# Patient Record
Sex: FEMALE | Race: BLACK OR AFRICAN AMERICAN | Employment: PART TIME | URBAN - NONMETROPOLITAN AREA
[De-identification: names, ages, dates, MRNs, and addresses within clinical notes are randomized per-mention and may not be internally consistent; named-entity substitution may affect disease eponyms.]

---

## 2020-06-11 ENCOUNTER — HOSPITAL ENCOUNTER (OUTPATIENT)
Dept: LAB | Age: 55
Discharge: HOME | End: 2020-06-11
Payer: COMMERCIAL

## 2020-06-11 ENCOUNTER — OFFICE VISIT (OUTPATIENT)
Dept: FAMILY MEDICINE CLINIC | Age: 55
End: 2020-06-11
Payer: COMMERCIAL

## 2020-06-11 VITALS
DIASTOLIC BLOOD PRESSURE: 70 MMHG | WEIGHT: 196 LBS | HEIGHT: 69 IN | HEART RATE: 84 BPM | SYSTOLIC BLOOD PRESSURE: 120 MMHG | TEMPERATURE: 97.4 F | RESPIRATION RATE: 18 BRPM | BODY MASS INDEX: 29.03 KG/M2

## 2020-06-11 LAB
ANION GAP SERPL CALCULATED.3IONS-SCNC: 11 MMOL/L (ref 9–17)
BUN BLDV-MCNC: 17 MG/DL (ref 6–20)
BUN/CREAT BLD: 24 (ref 9–20)
CALCIUM SERPL-MCNC: 9.4 MG/DL (ref 8.6–10.4)
CHLORIDE BLD-SCNC: 105 MMOL/L (ref 98–107)
CHOLESTEROL/HDL RATIO: 3.7
CHOLESTEROL: 227 MG/DL
CO2: 26 MMOL/L (ref 20–31)
CREAT SERPL-MCNC: 0.72 MG/DL (ref 0.5–0.9)
ESTIMATED AVERAGE GLUCOSE: 128 MG/DL
GFR AFRICAN AMERICAN: >60 ML/MIN
GFR NON-AFRICAN AMERICAN: >60 ML/MIN
GFR SERPL CREATININE-BSD FRML MDRD: ABNORMAL ML/MIN/{1.73_M2}
GFR SERPL CREATININE-BSD FRML MDRD: ABNORMAL ML/MIN/{1.73_M2}
GLUCOSE BLD-MCNC: 133 MG/DL (ref 70–99)
HBA1C MFR BLD: 6.1 % (ref 4.8–5.9)
HDLC SERPL-MCNC: 62 MG/DL
LDL CHOLESTEROL: 135 MG/DL (ref 0–130)
POTASSIUM SERPL-SCNC: 4.4 MMOL/L (ref 3.7–5.3)
SODIUM BLD-SCNC: 142 MMOL/L (ref 135–144)
TRIGL SERPL-MCNC: 150 MG/DL
VLDLC SERPL CALC-MCNC: ABNORMAL MG/DL (ref 1–30)

## 2020-06-11 PROCEDURE — 99203 OFFICE O/P NEW LOW 30 MIN: CPT | Performed by: FAMILY MEDICINE

## 2020-06-11 PROCEDURE — 80048 BASIC METABOLIC PNL TOTAL CA: CPT

## 2020-06-11 PROCEDURE — 3044F HG A1C LEVEL LT 7.0%: CPT | Performed by: FAMILY MEDICINE

## 2020-06-11 PROCEDURE — 83036 HEMOGLOBIN GLYCOSYLATED A1C: CPT

## 2020-06-11 PROCEDURE — 80061 LIPID PANEL: CPT

## 2020-06-11 PROCEDURE — G8427 DOCREV CUR MEDS BY ELIG CLIN: HCPCS | Performed by: FAMILY MEDICINE

## 2020-06-11 PROCEDURE — 99214 OFFICE O/P EST MOD 30 MIN: CPT

## 2020-06-11 PROCEDURE — 2022F DILAT RTA XM EVC RTNOPTHY: CPT | Performed by: FAMILY MEDICINE

## 2020-06-11 PROCEDURE — 1036F TOBACCO NON-USER: CPT | Performed by: FAMILY MEDICINE

## 2020-06-11 PROCEDURE — 36415 COLL VENOUS BLD VENIPUNCTURE: CPT

## 2020-06-11 PROCEDURE — G8419 CALC BMI OUT NRM PARAM NOF/U: HCPCS | Performed by: FAMILY MEDICINE

## 2020-06-11 PROCEDURE — 3017F COLORECTAL CA SCREEN DOC REV: CPT | Performed by: FAMILY MEDICINE

## 2020-06-11 RX ORDER — PREDNISONE 20 MG/1
20 TABLET ORAL DAILY
Qty: 5 TABLET | Refills: 0 | Status: SHIPPED | OUTPATIENT
Start: 2020-06-11 | End: 2020-06-16

## 2020-06-11 SDOH — HEALTH STABILITY: MENTAL HEALTH: HOW OFTEN DO YOU HAVE A DRINK CONTAINING ALCOHOL?: NEVER

## 2020-06-11 SDOH — ECONOMIC STABILITY: TRANSPORTATION INSECURITY
IN THE PAST 12 MONTHS, HAS LACK OF TRANSPORTATION KEPT YOU FROM MEETINGS, WORK, OR FROM GETTING THINGS NEEDED FOR DAILY LIVING?: PATIENT DECLINED

## 2020-06-11 SDOH — ECONOMIC STABILITY: FOOD INSECURITY: WITHIN THE PAST 12 MONTHS, THE FOOD YOU BOUGHT JUST DIDN'T LAST AND YOU DIDN'T HAVE MONEY TO GET MORE.: PATIENT DECLINED

## 2020-06-11 SDOH — ECONOMIC STABILITY: TRANSPORTATION INSECURITY
IN THE PAST 12 MONTHS, HAS THE LACK OF TRANSPORTATION KEPT YOU FROM MEDICAL APPOINTMENTS OR FROM GETTING MEDICATIONS?: PATIENT DECLINED

## 2020-06-11 SDOH — ECONOMIC STABILITY: FOOD INSECURITY: WITHIN THE PAST 12 MONTHS, YOU WORRIED THAT YOUR FOOD WOULD RUN OUT BEFORE YOU GOT MONEY TO BUY MORE.: PATIENT DECLINED

## 2020-06-11 SDOH — ECONOMIC STABILITY: INCOME INSECURITY: HOW HARD IS IT FOR YOU TO PAY FOR THE VERY BASICS LIKE FOOD, HOUSING, MEDICAL CARE, AND HEATING?: PATIENT DECLINED

## 2020-06-11 ASSESSMENT — PATIENT HEALTH QUESTIONNAIRE - PHQ9
SUM OF ALL RESPONSES TO PHQ QUESTIONS 1-9: 1
1. LITTLE INTEREST OR PLEASURE IN DOING THINGS: 0
2. FEELING DOWN, DEPRESSED OR HOPELESS: 1
SUM OF ALL RESPONSES TO PHQ9 QUESTIONS 1 & 2: 1
SUM OF ALL RESPONSES TO PHQ QUESTIONS 1-9: 1

## 2020-06-11 ASSESSMENT — ENCOUNTER SYMPTOMS
COUGH: 0
CHEST TIGHTNESS: 0
SHORTNESS OF BREATH: 0
WHEEZING: 0

## 2020-06-11 NOTE — PROGRESS NOTES
provided no relief. She had a trigger finger that was released with an injection but she did not like it. Past Medical History:   Diagnosis Date    Anxiety     Depression     Hypercholesteremia     Insomnia     Type 2 diabetes mellitus ([address]      History reviewed. No pertinent surgical history. Family History   Problem Relation Age of Onset    Diabetes type 2  Mother     Hypertension Mother     Alcohol Abuse Mother     Colon Cancer Father     Alcohol Abuse Father     Depression Sister     Depression Brother     Depression Brother     Depression Brother     Depression Sister     Depression Sister        Social History     Tobacco Use    Smoking status: Never Smoker    Smokeless tobacco: Never Used   Substance Use Topics    Alcohol use: Not Currently     Frequency: Never      Prior to Visit Medications    Not on File     No Known Allergies    Subjective:      Review of Systems   Constitutional: Negative for activity change, appetite change, chills, fatigue and fever. Eyes: Negative for visual disturbance. Respiratory: Negative for cough, chest tightness, shortness of breath and wheezing. Cardiovascular: Negative for chest pain, palpitations and leg swelling. Gastrointestinal:        She used to have GERD but it is better now   Genitourinary: Negative for difficulty urinating. Musculoskeletal: Positive for arthralgias (left elbow). Neurological: Negative for dizziness, tingling (in her right hand), syncope, weakness, light-headedness, numbness and headaches. Psychiatric/Behavioral: Positive for sleep disturbance (better with the marijuana). Objective:     Physical Exam  Vitals signs and nursing note reviewed. Constitutional:       General: She is not in acute distress. Appearance: She is well-developed. Eyes:      Conjunctiva/sclera: Conjunctivae normal.   Neck:      Musculoskeletal: Normal range of motion and neck supple. Thyroid: No thyromegaly.

## 2020-07-02 ENCOUNTER — HOSPITAL ENCOUNTER (OUTPATIENT)
Dept: LAB | Age: 55
Discharge: HOME | End: 2020-07-02
Payer: COMMERCIAL

## 2020-07-02 ENCOUNTER — OFFICE VISIT (OUTPATIENT)
Dept: FAMILY MEDICINE CLINIC | Age: 55
End: 2020-07-02
Payer: COMMERCIAL

## 2020-07-02 VITALS
RESPIRATION RATE: 18 BRPM | WEIGHT: 194 LBS | OXYGEN SATURATION: 94 % | DIASTOLIC BLOOD PRESSURE: 70 MMHG | HEART RATE: 68 BPM | BODY MASS INDEX: 28.73 KG/M2 | HEIGHT: 69 IN | SYSTOLIC BLOOD PRESSURE: 120 MMHG

## 2020-07-02 PROBLEM — K21.9 GASTROESOPHAGEAL REFLUX DISEASE WITHOUT ESOPHAGITIS: Status: ACTIVE | Noted: 2020-07-02

## 2020-07-02 PROBLEM — Z86.19 HISTORY OF HELICOBACTER PYLORI INFECTION: Status: ACTIVE | Noted: 2020-07-02

## 2020-07-02 LAB
HEPATITIS C ANTIBODY: NONREACTIVE
HIV AG/AB: NONREACTIVE

## 2020-07-02 PROCEDURE — 87491 CHLMYD TRACH DNA AMP PROBE: CPT

## 2020-07-02 PROCEDURE — 99213 OFFICE O/P EST LOW 20 MIN: CPT

## 2020-07-02 PROCEDURE — 87389 HIV-1 AG W/HIV-1&-2 AB AG IA: CPT

## 2020-07-02 PROCEDURE — G0145 SCR C/V CYTO,THINLAYER,RESCR: HCPCS

## 2020-07-02 PROCEDURE — 99396 PREV VISIT EST AGE 40-64: CPT | Performed by: FAMILY MEDICINE

## 2020-07-02 PROCEDURE — 87591 N.GONORRHOEAE DNA AMP PROB: CPT

## 2020-07-02 PROCEDURE — 86803 HEPATITIS C AB TEST: CPT

## 2020-07-02 PROCEDURE — 36415 COLL VENOUS BLD VENIPUNCTURE: CPT

## 2020-07-02 PROCEDURE — 87624 HPV HI-RISK TYP POOLED RSLT: CPT

## 2020-07-02 RX ORDER — DOXEPIN HYDROCHLORIDE 10 MG/1
10 CAPSULE ORAL NIGHTLY PRN
Qty: 30 CAPSULE | Refills: 1 | Status: SHIPPED | OUTPATIENT
Start: 2020-07-02

## 2020-07-02 RX ORDER — CHLORAL HYDRATE 500 MG
3000 CAPSULE ORAL 3 TIMES DAILY
Qty: 90 CAPSULE | Refills: 3 | Status: SHIPPED | OUTPATIENT
Start: 2020-07-02

## 2020-07-02 ASSESSMENT — ENCOUNTER SYMPTOMS
CONSTIPATION: 0
ABDOMINAL PAIN: 0
WHEEZING: 0
CHEST TIGHTNESS: 0
DIARRHEA: 0
SHORTNESS OF BREATH: 0
COUGH: 0

## 2020-07-02 NOTE — PROGRESS NOTES
[facility] [address]  [address] [address]  Dept: [phone]  Dept Fax: [phone]  Loc: [phone]    Diana Florence  is a 49 y.o. female who presents today for her medical conditions/complaints as noted below. Diana Florence is c/o of   Chief Complaint   Patient presents with    Anxiety     very emotional, brother passed away    Insomnia     not sleeping well    Joint Swelling     still having pain       HPI:     HPI Here today for her well visit. Anxiety: worsening; she has not been doing well. Her brother recently . She has been having trouble sleeping. She feels like she can't function with her job. She is very spiritual and she typically only does things if there is a reason for them and typically the reason she does things is to help her. She is getting very anxious in the middle of the night and it just isn't helping. She is having trouble falling asleep and she is not staying asleep well. She tries to remind herself that she is safe. She has roommates. She has never taken anything to help her sleep before. She occasionally smokes marijuana before bed, but she hasn't had the money for that. Elbow pain: improving; she got some relief from the steroids but her pain is still about a 7/10 at the end of her shift. She is rarely using nsaids. She is doing her home exercises but they haven't helped much yet. Her muscle cramps are better since being off of the statins. No issues with vaginal discharge, no odor, no itching or burning. She is just concerned because she broke up with her boyfriend. Past Medical History:   Diagnosis Date    Anxiety     Depression     Hypercholesteremia     Insomnia     Type 2 diabetes mellitus ([facility] [address]      History reviewed. No pertinent surgical history.     Family History   Problem Relation Age of Onset    Diabetes type 2  Mother     Hypertension Mother    Montrell Macdonald Alcohol Abuse Mother     Colon Cancer Father     Alcohol Abuse Father     Depression Sister     Depression Brother     Depression Brother     Depression Brother     Depression Sister     Depression Sister        Social History     Tobacco Use    Smoking status: Never Smoker    Smokeless tobacco: Never Used   Substance Use Topics    Alcohol use: Not Currently     Frequency: Never      Prior to Visit Medications    Medication Sig Taking? Authorizing Provider   Multiple Vitamins-Minerals (CENTRUM WOMEN PO) Take 1 tablet by mouth daily Yes Historical Provider, MD     No Known Allergies    Subjective:      Review of Systems   Constitutional: Negative for activity change, appetite change, chills, fatigue and fever. Eyes: Negative for visual disturbance. Respiratory: Negative for cough, chest tightness, shortness of breath and wheezing. Cardiovascular: Negative for chest pain, palpitations and leg swelling. Gastrointestinal: Negative for abdominal pain (some heart burn occasionally), constipation and diarrhea. Genitourinary: Negative for decreased urine volume, difficulty urinating, dysuria and vaginal discharge. Musculoskeletal: Positive for arthralgias (left elbow). Neurological: Negative for dizziness, syncope, weakness and light-headedness. Objective:     Physical Exam  Vitals signs and nursing note reviewed. Constitutional:       General: She is not in acute distress. Appearance: She is well-developed. Eyes:      Conjunctiva/sclera: Conjunctivae normal.   Neck:      Musculoskeletal: Normal range of motion and neck supple. Thyroid: No thyromegaly. Cardiovascular:      Rate and Rhythm: Normal rate and regular rhythm. Heart sounds: Normal heart sounds. No murmur. Pulmonary:      Effort: Pulmonary effort is normal. No respiratory distress. Breath sounds: Normal breath sounds. No wheezing.    Chest:      Breasts:         Right: Normal.         Left: Normal. Genitourinary:     Labia:         Right: No rash, tenderness, lesion or injury. Left: No rash, tenderness, lesion or injury. Urethra: No prolapse. Vagina: Normal.      Cervix: Normal.      Uterus: Normal.       Adnexa: Right adnexa normal and left adnexa normal.   Musculoskeletal:      Left elbow: She exhibits swelling. She exhibits normal range of motion, no effusion and no deformity. Tenderness found. Medial epicondyle tenderness noted. Lymphadenopathy:      Cervical: No cervical adenopathy. Skin:     General: Skin is warm and dry. Findings: No erythema or rash. Neurological:      Mental Status: She is alert and oriented to person, place, and time. /70 (Site: Right Upper Arm, Position: Sitting, Cuff Size: Large Adult)   Pulse 68   Resp 18   Ht 5' 9\" (1.753 m)   Wt 194 lb (88 kg)   SpO2 94%   BMI 28.65 kg/m²     Assessment:       Diagnosis Orders   1. Well woman exam with routine gynecological exam  PAP SMEAR   2. Medial epicondylitis, left elbow  diclofenac sodium (VOLTAREN) 1 % GEL    [facility] [place]   3. Psychophysiological insomnia     4. Gastroesophageal reflux disease without esophagitis     5. Screening for HIV (human immunodeficiency virus)  HIV Screen   6. Need for hepatitis C screening test  Hepatitis C Antibody   7. Screening examination for STD (sexually transmitted disease)  Chlamydia Trachomatis & Neisseria gonorrhoeae (GC) by amplified detection   8. History of Helicobacter pylori infection               Plan:       Well woman: she is doing well overall and she is trying to stay active and eat healthy. Her pap was done today as well as STD testing. She has a mammogram scheduled. Medial epicondylitis: improving; she is still having pain so I sent in Voltaren gel to try and I also referred her to PT. Insomnia: new; has started since her brother diet. I will try her on doxepin.      GERD: stable; she is doing okay as long as Acids (FISH OIL) 1000 MG CAPS     Sig: Take 3 capsules by mouth 3 times daily     Dispense:  90 capsule     Refill:  3       Patientgiven educational materials - see patient instructions. Discussed use, benefit,and side effects of prescribed medications. All patient questions answered. Ptvoiced understanding. Reviewed health maintenance. Instructed to continue currentmedications, diet and exercise. Patient agreed with treatment plan. Follow up asdirected. Royal Soriano LPN, am scribing for, and in the presence of Dr. Tiffany Barboza. Electronically signed by Rosemarie Sanchez LPN on 9/5/01 at 4:89 PM EDT. I agree to the above documentation placed by my scribe. I have personally evaluated this patient. Additional findings are as noted. I reviewed the scribe's note and agree with the documented findings and plan of care. Any areas of disagreement are corrected. I agree with the chief complaint, past medical history, past surgical history, allergies, medications, social and family history as documented unless otherwise noted below.      Electronically signed by Ivelisse Ortega MD on 7/2/2020 at 5:52 PM

## 2020-07-06 LAB
C TRACH DNA GENITAL QL NAA+PROBE: NEGATIVE
HPV SAMPLE: NORMAL
HPV, GENOTYPE 16: NOT DETECTED
HPV, GENOTYPE 18: NOT DETECTED
HPV, HIGH RISK OTHER: NOT DETECTED
HPV, INTERPRETATION: NORMAL
N. GONORRHOEAE DNA: NEGATIVE
SPECIMEN DESCRIPTION: NORMAL
SPECIMEN DESCRIPTION: NORMAL

## 2020-07-09 LAB — CYTOLOGY REPORT: NORMAL

## 2020-07-15 ENCOUNTER — OFFICE VISIT (OUTPATIENT)
Dept: OPTOMETRY | Age: 55
End: 2020-07-15
Payer: COMMERCIAL

## 2020-07-15 PROCEDURE — 92250 FUNDUS PHOTOGRAPHY W/I&R: CPT | Performed by: OPTOMETRIST

## 2020-07-15 PROCEDURE — 99204 OFFICE O/P NEW MOD 45 MIN: CPT | Performed by: OPTOMETRIST

## 2020-07-15 PROCEDURE — 2024F 7 FLD RTA PHOTO EVC RTNOPTHY: CPT | Performed by: OPTOMETRIST

## 2020-07-15 PROCEDURE — G8427 DOCREV CUR MEDS BY ELIG CLIN: HCPCS | Performed by: OPTOMETRIST

## 2020-07-15 PROCEDURE — 1036F TOBACCO NON-USER: CPT | Performed by: OPTOMETRIST

## 2020-07-15 PROCEDURE — 3017F COLORECTAL CA SCREEN DOC REV: CPT | Performed by: OPTOMETRIST

## 2020-07-15 PROCEDURE — G8419 CALC BMI OUT NRM PARAM NOF/U: HCPCS | Performed by: OPTOMETRIST

## 2020-07-15 PROCEDURE — 3044F HG A1C LEVEL LT 7.0%: CPT | Performed by: OPTOMETRIST

## 2020-07-15 RX ORDER — TROPICAMIDE 10 MG/ML
1 SOLUTION/ DROPS OPHTHALMIC ONCE
Status: COMPLETED | OUTPATIENT
Start: 2020-07-15 | End: 2020-07-15

## 2020-07-15 RX ADMIN — TROPICAMIDE 1 DROP: 10 SOLUTION/ DROPS OPHTHALMIC at 15:27

## 2020-07-15 ASSESSMENT — REFRACTION_MANIFEST
OD_CYLINDER: -0.50
OD_AXIS: 080
OS_ADD: +2.00
OD_SPHERE: +0.25
OS_CYLINDER: -0.75
OS_AXIS: 125
OD_ADD: +2.00
OS_SPHERE: +1.25

## 2020-07-15 ASSESSMENT — REFRACTION_WEARINGRX
SPECS_TYPE: OTC READERS
OS_SPHERE: +1.75
OD_SPHERE: +1.75

## 2020-07-15 ASSESSMENT — ENCOUNTER SYMPTOMS
EYES NEGATIVE: 0
GASTROINTESTINAL NEGATIVE: 0
ALLERGIC/IMMUNOLOGIC NEGATIVE: 0
RESPIRATORY NEGATIVE: 0

## 2020-07-15 ASSESSMENT — VISUAL ACUITY
METHOD: SNELLEN - LINEAR
OS_SC: 20/25
OD_SC: 20/30 OU
OD_SC: 20/20

## 2020-07-15 ASSESSMENT — TONOMETRY
OD_IOP_MMHG: 20
IOP_METHOD: NON-CONTACT AIR PUFF
OS_IOP_MMHG: 23

## 2020-07-15 ASSESSMENT — SLIT LAMP EXAM - LIDS
COMMENTS: NORMAL
COMMENTS: NORMAL

## 2020-07-15 NOTE — PROGRESS NOTES
Inge Loco presents today for   Chief Complaint   Patient presents with    Blurred Vision    Ophth Diabetic Exam   .    HPI     Blurred Vision     In both eyes. Vision is blurred. Context:  distance vision and reading. Comments     Last Vision Exam: 5 + yrs ago  Last Ophthalmology Exam: n/a  Last Filled Glasses Rx: 5 + yrs  Insurance: Superior  Update: Dm Exam; Glasses  Distance and reading are getting more blurry  Wearing OTC +1.75  Diabetic:  Sugars: doesn't have to check. HmgA1C: 6.1  6/11/2020  Near is blurry; wearing over the counter  Distance is blurry   Dilation today                    Current Outpatient Medications   Medication Sig Dispense Refill    diclofenac sodium (VOLTAREN) 1 % GEL Apply 2 g topically 4 times daily as needed for Pain 1 Tube 3    doxepin (SINEQUAN) 10 MG capsule Take 1 capsule by mouth nightly as needed (sleep) 30 capsule 1    Omega-3 Fatty Acids (FISH OIL) 1000 MG CAPS Take 3 capsules by mouth 3 times daily 90 capsule 3    Multiple Vitamins-Minerals (CENTRUM WOMEN PO) Take 1 tablet by mouth daily       No current facility-administered medications for this visit.       ROS     Negative for: Constitutional, Gastrointestinal, Neurological, Skin, Genitourinary, Musculoskeletal, HENT, Endocrine, Cardiovascular, Eyes, Respiratory, Psychiatric, Allergic/Imm, Heme/Lymph          Family History   Problem Relation Age of Onset    Diabetes type 2  Mother     Hypertension Mother     Alcohol Abuse Mother     Colon Cancer Father     Alcohol Abuse Father     Depression Sister     Depression Brother     Depression Brother     Depression Brother     Depression Sister     Depression Sister     Cataracts Neg Hx     Diabetes Neg Hx     Glaucoma Neg Hx      Social History     Socioeconomic History    Marital status:      Spouse name: None    Number of children: None    Years of education: None    Highest education level: None   Occupational History    None Social Needs    Financial resource strain: Patient refused    Food insecurity     Worry: Patient refused     Inability: Patient refused    Transportation needs     Medical: Patient refused     Non-medical: Patient refused   Tobacco Use    Smoking status: Never Smoker    Smokeless tobacco: Never Used   Substance and Sexual Activity    Alcohol use: Not Currently     Frequency: Never    Drug use: Yes     Types: Marijuana     Comment: uses before bed and when wakes up in the middle of the night    Sexual activity: Not Currently     Partners: Male   Lifestyle    Physical activity     Days per week: None     Minutes per session: None    Stress: None   Relationships    Social connections     Talks on phone: None     Gets together: None     Attends Yazidism service: None     Active member of club or organization: None     Attends meetings of clubs or organizations: None     Relationship status: None    Intimate partner violence     Fear of current or ex partner: None     Emotionally abused: None     Physically abused: None     Forced sexual activity: None   Other Topics Concern    None   Social History Narrative    None       Past Medical History:   Diagnosis Date    Anxiety     Depression     Hypercholesteremia     Insomnia     Type 2 diabetes mellitus ([facility] [address]          Main Ophthalmology Exam     External Exam       Right Left    External Normal Normal          Slit Lamp Exam       Right Left    Lids/Lashes Normal Normal    Conjunctiva/Sclera White and quiet White and quiet    Cornea Clear Clear    Anterior Chamber Deep and quiet Deep and quiet    Iris Round and reactive Round and reactive    Lens Trace Nuclear sclerosis pigment on anterior capusle;, Trace Nuclear sclerosis    Vitreous Normal Normal          Fundus Exam       Right Left    Disc Normal Normal    C/D Ratio 0.2 0.2    Macula Normal slight mottling     Vessels Normal Normal    Periphery Normal Normal    78 diopter Tonometry     Tonometry (Non-contact air puff, 3:20 PM)       Right Left    Pressure 20 23   IOP.1             23.3  CH:  11.4          9.8  WS: 4.3          6.3                   Visual Acuity (Snellen - Linear)       Right Left    Dist sc 20/20 20/25    Near sc 20/30 OU          Not recorded        Pupils     Pupils       Pupils    Right PERRL    Left PERRL               Not recorded         Not recorded          Ophthalmology Exam     Wearing Rx       Sphere    Right +1.75    Left +1.75    Type:  OTC Readers                Manifest Refraction     Manifest Refraction       Sphere Cylinder Axis Dist VA Add    Right +0.25 -0.50 080 20/25 +2.00    Left +1.25 -0.75 125 20/25 +2.00          Manifest Refraction #2 (Auto)       Sphere Cylinder Axis Dist VA Add    Right +0.50 -0.75 075      Left +1.50 -1.00 119                 Final Rx       Sphere Cylinder Axis Add    Right +0.25 -0.50 080 +2.00    Left +1.25 -0.75 125 +2.00    Type:  Bifocal    Expiration Date:  2022          Neuro/Psych     Neuro/Psych     Oriented x3:  Yes    Mood/Affect:  Normal                No diabetic retinopathy    Diagnosis Orders   1. Non-insulin dependent type 2 diabetes mellitus (HCC)  [facility] DIABETES EYE EXAM    Color Fundus Photography-OU-Both Eyes   2. Blurred vision, bilateral     3.  Astigmatism of both eyes with presbyopia         Glycemic control as per PCP   Patient Instructions   New glasses recommended;    Keep yearly appointments because of diabetes        Return in about 1 year (around 7/15/2021) for complete eye exam.

## 2020-07-17 ENCOUNTER — TELEPHONE (OUTPATIENT)
Dept: FAMILY MEDICINE CLINIC | Age: 55
End: 2020-07-17

## 2020-07-17 ENCOUNTER — HOSPITAL ENCOUNTER (OUTPATIENT)
Dept: PHYSICAL THERAPY | Age: 55
Setting detail: THERAPIES SERIES
Discharge: HOME | End: 2020-07-17

## 2020-07-17 ENCOUNTER — HOSPITAL ENCOUNTER (OUTPATIENT)
Dept: OCCUPATIONAL THERAPY | Age: 55
Setting detail: THERAPIES SERIES
Discharge: HOME | End: 2020-07-17
Payer: COMMERCIAL

## 2020-07-17 PROCEDURE — 97165 OT EVAL LOW COMPLEX 30 MIN: CPT

## 2020-07-17 PROCEDURE — 97140 MANUAL THERAPY 1/> REGIONS: CPT

## 2020-07-17 ASSESSMENT — 9 HOLE PEG TEST
TEST_RESULT: FUNCTIONAL
TESTTIME_SECONDS: 22
TESTTIME_SECONDS: 18
TEST_RESULT: IMPAIRED

## 2020-07-17 ASSESSMENT — PAIN DESCRIPTION - DESCRIPTORS: DESCRIPTORS: ACHING

## 2020-07-17 ASSESSMENT — PAIN DESCRIPTION - LOCATION: LOCATION: ELBOW

## 2020-07-17 ASSESSMENT — PAIN DESCRIPTION - ORIENTATION: ORIENTATION: LEFT

## 2020-07-17 ASSESSMENT — PAIN SCALES - GENERAL: PAINLEVEL_OUTOF10: 8

## 2020-07-17 NOTE — FLOWSHEET NOTE
[facility]    [x] [place]  Phone: [phone]  Fax: [phone]      [] [place]  Phone: [phone]  Fax: [phone]    Occupational Therapy Daily Treatment Note  Date:  2020    Patient Name:  Fatemeh Lobo    :  1965  MRN: [id number]  Restrictions/Precautions:      Medical/Treatment Diagnosis Information:   Diagnosis: Medial epicondylitis, left elbow      Insurance/Certification information:   Physician Information: Referring Practitioner: Estela Self  Plan of care signed (Y/N):   n    Visit# / total visits:      Pain level: /10     Progress Note: [x]  Yes  []  No  Next due by: Visit #10      Date of evaluation/re-evaluation:    Time In: 325  Time Out:  410    Subjective:   Pt arrived to appointment late, pt stated she was on her lunch break from work. Pt is a 48 yo female with medial epicondylitis of left elbow. Objective/Assessment:   Initial evaluation completed this date, see progress note for details.     Pt instructed to complete massage to medial aspect of left elbow and to complete stretch with elbow extended and forearm supinated, actively extending the wrist.    Exercises:   Exercise/Equipment Resistance/Repetitions Other comments                                                                          Therapeutic Exercise  [] Provided verbal/tactile cueing for activities related to strengthening, flexibility, endurance, ROM. (79380)  Neuro  Re-Ed  [] Provided verbal/tactile cueing for activities related to improving balance, coordination, kinesthetic sense, posture, motor skill, proprioception. (40238)     Therapeutic Activities/ADL:   [] Provided use of dynamic activities to improve functional performance (30650)  [] Provided self-care/home management training for activities of daily living and compensatory training (15159)     Manual Treatments:   [] Provided manual therapy to mobilize soft tissue/joints for the purpose of

## 2020-07-17 NOTE — TELEPHONE ENCOUNTER
Maria Antonia from rehab called and stated that patients order for PT needs to be changed to OT.  Thank you

## 2020-07-17 NOTE — PLAN OF CARE
Occupational Therapy    [x] [place]  Phone: [phone]  Fax: [phone]      [] [place]  Phone: [phone]  Fax: [phone]       To: Referring Practitioner: Naeem Alexis      Patient: Inge Loco  : 1965  MRN: [id number]  Evaluation Date: 2020      Diagnosis Information:  Diagnosis: Medial epicondylitis, left elbow         Occupational Therapy Certification/Re-Certification Form  Dear Dr. Guido Silver,  The following patient has been evaluated for occupational therapy services and for therapy to continue, insurance requires physician review of the treatment plan. Please review the attached evaluation and/or summary of the patient's plan of care, and verify that you agree therapy should continue by signing the attached document and sending it back to our office.     Plan of Care/Treatment to date: 2020-2020  [x] Therapeutic Exercise   [x] Modalities:  [x] Therapeutic Activity    [x] Ultrasound  [x] Electrical Stimulation   [x] Activities of Daily Living    [] Paraffin   [x] Kinesiotaping  [] Neuromuscular Re-education   [] Iontophoresis [x] Coldpack/hotpack   [x] Instruction in HEP     [] Integrative Dry Needling  [x] Manual Therapy     Other:  [] Aquatic Therapy      []       Frequency/Duration:  # Days per week: [] 1 day # Weeks: [] 1 week [] 5 weeks      [x] 2-3 days   [] 2 weeks [] 6 weeks     [] 3 days   [] 3 weeks [] 7 weeks     [] 4 days   [x] 4 weeks [] 8 weeks  Goals:  Short term goals  Time Frame for Short term goals: 2 weeks  Short term goal 1: Fit/issue orthotics/splinting as needed  Short term goal 2: Pt will decrease edema in L elbow by 0.5 cm  Short term goal 3: Pt will report decreased pain of < 4/10 with use and movement to increase functional use of left UE     Long term goals  Time Frame for Long term goals : 4 weeks  Long term goal 1: Pt will be independent and compliant with HEP  Long term goal 2: Pt will report decreased pain of < 1-2/10 with use and movement to increase functional use of left UE  Long term goal 3: Pt will improve  strength in LUE to > 55# to improve ability to lift and carry for work tasks  Long term goal 4: Pt will increase score on UEFI to < 20% impairment with daily tasks to indicate increased ease with functional tasks    Rehab Potential: [] excellent [x] good [] fair  [] poor     Electronically signed by:  Velma Contreras OT      If you have any questions or concerns, please don't hesitate to call.   Thank you for your referral.      Physician Signature:________________________________Date:__________________  By signing above, therapists plan is approved by physician

## 2020-07-17 NOTE — PROGRESS NOTES
0°  L Forearm Pron 0-90: 90°  Left Hand PROM (degrees)  Left Hand PROM: WFL  Left Hand AROM (degrees)  Left Hand AROM: WFL  RUE AROM (degrees)  RUE AROM : WFL  R Elbow Flexion 0-145: 145°  R Elbow Extension 145-0: 0°  R Forearm Pron 0-90: 90°  Right Hand AROM (degrees)  Right Hand AROM: WFL  LUE Strength  Gross LUE Strength: WFL  L Shoulder Flex: 4+/5  L Shoulder ABduction: 4+/5  L Elbow Flex: 4/5  L Elbow Ext: 4/5  L Wrist Flex: 4+/5  L Wrist Ext: 4+/5  L Wrist Radial Deviation: 4+/5  L Wrist Ulnar Deviation: 4+/5  L Hand General: 4+/5  RUE Strength  Gross RUE Strength: WFL  R Shoulder Flex: 4+/5  R Shoulder ABduction: 4+/5  R Elbow Flex: 4+/5  R Elbow Ext: 4+/5  R Wrist Flex: 5/5  R Wrist Ext: 5/5  R Wrist Radial Deviation: 5/5  R Wrist Ulnar Deviation: 5/5  R Hand General: 5/5  Hand Dominance  Hand Dominance: Right  Left Hand Strength -  (lbs)  Handle Setting 2: 39((Avg= 57.3))  Left Hand Strength - Pinch (lbs)  Lateral: 8  Tip: 9  Palmar 3 point: 10  LUE Edema - Circumference (cm)  LUE Edema Present?: Yes  Proximal to Elbow Crease (5\"): 28(measured at 2 cm above olecranon process)  Elbow Crease: 26.9  Left 9-Hole Peg Test  Left 9-Hole Peg Test: Impaired  Right Hand Strength -  (lbs)  Handle Setting 2: 60  Right Hand Strength - Pinch (lbs)  Lateral: 20  Tip: 12  Palmar 3 point: 16  RUE Edema - Circumference (cm)  RUE Edema Present?: No  Proximal to Elbow Crease (5\"): 27. 5(measured at 2 cm above olecranon process)  Elbow Crease: 26.4  Right 9-Hole Peg Test  Right 9-Hole Peg Test: Functional  Fine Motor Skills  Left 9-Hole Peg Test: Impaired  Left 9 Hole Peg Test Time (secs): 22((Avg= 18.92))  Right 9-Hole Peg Test: Functional  Right 9 Hole Peg Test Time (secs): 18((Avg= 17.38))  Hand Assessment Comment  Hand Assessment Comment: Upper Extremity Functional Index (UEFI): 46/80, indicating 57.5% impairment with daily tasks    Plan   Plan  Times per week: 2-3  Plan weeks: 4    Goals  Short term goals  Time Frame for Short term goals: 2 weeks  Short term goal 1: Fit/issue orthotics/splinting as needed  Short term goal 2: Pt will decrease edema in L elbow by 0.5 cm  Short term goal 3: Pt will report decreased pain of < 4/10 with use and movement to increase functional use of left UE    Long term goals  Time Frame for Long term goals : 4 weeks  Long term goal 1: Pt will be independent and compliant with HEP  Long term goal 2: Pt will report decreased pain of < 1-2/10 with use and movement to increase functional use of left UE  Long term goal 3: Pt will improve  strength in LUE to > 55# to improve ability to lift and carry for work tasks  Long term goal 4: Pt will increase score on UEFI to < 20% impairment with daily tasks to indicate increased ease with functional tasks       Therapy Time   Individual Concurrent Group Co-treatment   Time In 1525         Time Out 1610         Minutes 45         Timed Code Treatment Minutes: [address], OT

## 2020-08-04 ENCOUNTER — HOSPITAL ENCOUNTER (OUTPATIENT)
Dept: OCCUPATIONAL THERAPY | Age: 55
Setting detail: THERAPIES SERIES
Discharge: HOME | End: 2020-08-04
Payer: COMMERCIAL

## 2020-08-04 NOTE — PROGRESS NOTES
Outpatient Occupational Therapy    [x] [place]  Phone: [phone]  Fax: [phone]      [] [place]  Phone: [phone]  Fax: [phone]    Occupational Therapy  Cancellation/No-show Note  Patient Name:  Fatemeh Lobo   :  1965   Date:  2020  Cancelled visits to date: 1  No-shows to date: 0    For today's appointment patient:  [x]    Cancelled  []    Rescheduled appointment  []    No-show     Reason given by patient:  []    Patient ill  []    Conflicting appointment  []    No transportation    []    Conflict with work  [x]    No reason given  []    Other:     Comments:      Electronically signed by:  Arvell Lanes, OT

## 2020-08-07 ENCOUNTER — HOSPITAL ENCOUNTER (OUTPATIENT)
Dept: OCCUPATIONAL THERAPY | Age: 55
Setting detail: THERAPIES SERIES
Discharge: HOME | End: 2020-08-07
Payer: COMMERCIAL

## 2020-08-07 NOTE — PROGRESS NOTES
Outpatient Occupational Therapy    [x] [place]  Phone: [phone]  Fax: [phone]      [] [place]  Phone: [phone]  Fax: [phone]    Occupational Therapy  Cancellation/No-show Note  Patient Name:  Yoandy Fajardo   :  1965   Date:  2020  Cancelled visits to date: 2  No-shows to date: 0    For today's appointment patient:  [x]    Cancelled  []    Rescheduled appointment  []    No-show     Reason given by patient:  [x]    Patient ill  []    Conflicting appointment  []    No transportation    []    Conflict with work  []    No reason given  []    Other:     Comments:      Electronically signed by:  Dung Cramer OT

## 2020-08-10 ENCOUNTER — HOSPITAL ENCOUNTER (OUTPATIENT)
Dept: OCCUPATIONAL THERAPY | Age: 55
Setting detail: THERAPIES SERIES
Discharge: HOME | End: 2020-08-10
Payer: COMMERCIAL

## 2020-08-10 PROCEDURE — 97530 THERAPEUTIC ACTIVITIES: CPT

## 2020-08-10 PROCEDURE — 97140 MANUAL THERAPY 1/> REGIONS: CPT

## 2020-08-10 NOTE — FLOWSHEET NOTE
[facility]    [x] [place]  Phone: [phone]  Fax: [phone]      [] [place]  Phone: [phone]  Fax: [phone]    Occupational Therapy Daily Treatment Note  Date:  8/10/2020    Patient Name:  Caridad Finney    :  1965  MRN: [id number]  Restrictions/Precautions:      Medical/Treatment Diagnosis Information:          Insurance/Certification information:   Physician Information:    Plan of care signed (Y/N):   n    Visit# / total visits:      Pain level: -8/10 at medial epicondyle of left elbow    Progress Note: [x]  Yes  []  No  Next due by: Visit #10      Date of evaluation/re-evaluation:  2020-2020    Time In: 105  Time Out:  145    Subjective:   Pt arrived to appointment late, pt stated she was on her lunch break from work. Pt is a 48 yo female with medial epicondylitis of left elbow. Objective/Assessment:   1:1 therapeutic activity provided to improve function of LUE  MH applied to L elbow for 8 minutes to reduce pain  Manual massage applied in clockwise, counter clockwise, and along length of flexor pronator muscles and perpendicular to muscle fibers. Patient fitted for IMAK elbow wrap and instructed to wear at night    Proximal to Elbow Crease: 27. 3(measured at 2 cm above olecranon process)  Elbow Crease: 26.5    Left Hand Strength -  (lbs)  Handle Setting 2: 35((Avg= 57.3))    Exercises:   Exercise/Equipment Resistance/Repetitions Other comments   Ulnar nerve glide Hold 10s             5x Arm against wall, tilt head away from extended arm   Medial Epicondylitis Flexor stretch Hold 30s             2x Elbow straight with wrist flexion and elbow straight with wrist extension with palm facing up                                                                Therapeutic Exercise  [] Provided verbal/tactile cueing for activities related to strengthening, flexibility, endurance, ROM. (23281)  Neuro  Re-Ed  [] Provided

## 2020-08-10 NOTE — PLAN OF CARE
Occupational Therapy    [x] [place]  Phone: [phone]  Fax: [phone]      [] [place]  Phone: [phone]  Fax: [phone]       To:        Patient: Sarabjit Abarca  : 1965  MRN: [id number]  Evaluation Date: 8/10/2020      Diagnosis Information:            Occupational Therapy Certification/Re-Certification Form  Dear Dr. Lamberto Elder,  The following patient has been evaluated for occupational therapy services and for therapy to continue, insurance requires physician review of the treatment plan. Please review the attached evaluation and/or summary of the patient's plan of care, and verify that you agree therapy should continue by signing the attached document and sending it back to our office. The plan of care has been extended by 2 weeks since patient was unable to attend therapy sessions until the forth week of her plan of care.      Plan of Care/Treatment to date: 2020-2020  [x] Therapeutic Exercise   [x] Modalities:  [x] Therapeutic Activity    [x] Ultrasound  [x] Electrical Stimulation   [x] Activities of Daily Living    [] Paraffin   [x] Kinesiotaping  [] Neuromuscular Re-education   [] Iontophoresis [x] Coldpack/hotpack   [x] Instruction in HEP     [] Integrative Dry Needling  [x] Manual Therapy     Other:  [] Aquatic Therapy      []       Frequency/Duration:  # Days per week: [] 1 day # Weeks: [] 1 week [] 5 weeks      [x] 2-3 days   [] 2 weeks [x] 6 weeks     [] 3 days   [] 3 weeks [] 7 weeks     [] 4 days   [] 4 weeks [] 8 weeks  Goals:  Short term goals  Time Frame for Short term goals: 2 weeks  Short term goal 1: Fit/issue orthotics/splinting as needed  Short term goal 2: Pt will decrease edema in L elbow by 0.5 cm  Short term goal 3: Pt will report decreased pain of < 4/10 with use and movement to increase functional use of left UE     Long term goals  Time Frame for Long term goals : 6 weeks  Long term goal 1: Pt will be independent and compliant with HEP  Long term goal 2: Pt will report decreased pain of < 1-2/10 with use and movement to increase functional use of left UE  Long term goal 3: Pt will improve  strength in LUE to > 55# to improve ability to lift and carry for work tasks  Long term goal 4: Pt will increase score on UEFI to < 20% impairment with daily tasks to indicate increased ease with functional tasks    Rehab Potential: [] excellent [x] good [] fair  [] poor     Electronically signed by:  Thelma Carlos OT      If you have any questions or concerns, please don't hesitate to call.   Thank you for your referral.      Physician Signature:________________________________Date:__________________  By signing above, therapists plan is approved by physician

## 2020-08-17 NOTE — DISCHARGE SUMMARY
patient      Goals/Progress towards goals:    Short term goals  Time Frame for Short term goals: 2 weeks  Short term goal 1: Pt fitted for orthotic/splinting as needed -GOAL MET  Short term goal 2: Pt will decrease edema in L elbow by 0.5 cm  Short term goal 3: Pt will report decreased pain of < 4/10 with use and movement to increase functional use of left UE  Long term goals  Time Frame for Long term goals : 4 weeks  Long term goal 1: Pt will be independent and compliant with HEP  Long term goal 2: Pt will report decreased pain of < 1-2/10 with use and movement to increase functional use of left UE  Long term goal 3: Pt will improve  strength in LUE to > 55# to improve ability to lift and carry for work tasks  Long term goal 4: Pt will increase score on UEFI to < 20% impairment with daily tasks to indicate increased ease with functional tasks\    Percentage of Goals Met: 15%        Discharge Prognosis: [] Excellent [] Good [x] Fair  [] Poor     Goal Status:  [] Achieved [x] Partially Achieved  [] Not Achieved     Patient Status:     [x] Patient now discharged              Electronically signed by:  Isma Serrano OT

## 2020-09-11 ENCOUNTER — OFFICE VISIT (OUTPATIENT)
Dept: FAMILY MEDICINE CLINIC | Age: 55
End: 2020-09-11
Payer: COMMERCIAL

## 2020-09-11 ENCOUNTER — HOSPITAL ENCOUNTER (OUTPATIENT)
Dept: GENERAL RADIOLOGY | Age: 55
Discharge: HOME | End: 2020-09-13
Payer: COMMERCIAL

## 2020-09-11 VITALS
OXYGEN SATURATION: 98 % | SYSTOLIC BLOOD PRESSURE: 128 MMHG | WEIGHT: 203 LBS | HEIGHT: 69 IN | DIASTOLIC BLOOD PRESSURE: 82 MMHG | HEART RATE: 68 BPM | RESPIRATION RATE: 18 BRPM | BODY MASS INDEX: 30.07 KG/M2

## 2020-09-11 PROCEDURE — G8427 DOCREV CUR MEDS BY ELIG CLIN: HCPCS | Performed by: FAMILY MEDICINE

## 2020-09-11 PROCEDURE — 1036F TOBACCO NON-USER: CPT | Performed by: FAMILY MEDICINE

## 2020-09-11 PROCEDURE — 99214 OFFICE O/P EST MOD 30 MIN: CPT | Performed by: FAMILY MEDICINE

## 2020-09-11 PROCEDURE — G8431 POS CLIN DEPRES SCRN F/U DOC: HCPCS | Performed by: FAMILY MEDICINE

## 2020-09-11 PROCEDURE — 73130 X-RAY EXAM OF HAND: CPT

## 2020-09-11 PROCEDURE — 3017F COLORECTAL CA SCREEN DOC REV: CPT | Performed by: FAMILY MEDICINE

## 2020-09-11 PROCEDURE — L3908 WHO COCK-UP NONMOLDE PRE OTS: HCPCS | Performed by: FAMILY MEDICINE

## 2020-09-11 PROCEDURE — G8419 CALC BMI OUT NRM PARAM NOF/U: HCPCS | Performed by: FAMILY MEDICINE

## 2020-09-11 PROCEDURE — 73110 X-RAY EXAM OF WRIST: CPT

## 2020-09-11 PROCEDURE — 96160 PT-FOCUSED HLTH RISK ASSMT: CPT | Performed by: FAMILY MEDICINE

## 2020-09-11 RX ORDER — PREDNISONE 20 MG/1
20 TABLET ORAL 2 TIMES DAILY
Qty: 10 TABLET | Refills: 0 | Status: SHIPPED | OUTPATIENT
Start: 2020-09-11 | End: 2020-09-16

## 2020-09-11 RX ORDER — ESCITALOPRAM OXALATE 10 MG/1
10 TABLET ORAL DAILY
Qty: 30 TABLET | Refills: 3 | Status: SHIPPED | OUTPATIENT
Start: 2020-09-11 | End: 2020-10-20

## 2020-09-11 ASSESSMENT — PATIENT HEALTH QUESTIONNAIRE - PHQ9
SUM OF ALL RESPONSES TO PHQ QUESTIONS 1-9: 24
9. THOUGHTS THAT YOU WOULD BE BETTER OFF DEAD, OR OF HURTING YOURSELF: 2
3. TROUBLE FALLING OR STAYING ASLEEP: 3
SUM OF ALL RESPONSES TO PHQ9 QUESTIONS 1 & 2: 4
8. MOVING OR SPEAKING SO SLOWLY THAT OTHER PEOPLE COULD HAVE NOTICED. OR THE OPPOSITE, BEING SO FIGETY OR RESTLESS THAT YOU HAVE BEEN MOVING AROUND A LOT MORE THAN USUAL: 3
1. LITTLE INTEREST OR PLEASURE IN DOING THINGS: 1
SUM OF ALL RESPONSES TO PHQ QUESTIONS 1-9: 24
4. FEELING TIRED OR HAVING LITTLE ENERGY: 3
10. IF YOU CHECKED OFF ANY PROBLEMS, HOW DIFFICULT HAVE THESE PROBLEMS MADE IT FOR YOU TO DO YOUR WORK, TAKE CARE OF THINGS AT HOME, OR GET ALONG WITH OTHER PEOPLE: 3
6. FEELING BAD ABOUT YOURSELF - OR THAT YOU ARE A FAILURE OR HAVE LET YOURSELF OR YOUR FAMILY DOWN: 3
7. TROUBLE CONCENTRATING ON THINGS, SUCH AS READING THE NEWSPAPER OR WATCHING TELEVISION: 3
5. POOR APPETITE OR OVEREATING: 3
2. FEELING DOWN, DEPRESSED OR HOPELESS: 3

## 2020-09-11 ASSESSMENT — ENCOUNTER SYMPTOMS
COUGH: 0
ABDOMINAL PAIN: 0
CHEST TIGHTNESS: 0
SHORTNESS OF BREATH: 0
NAUSEA: 0
DIARRHEA: 0
CONSTIPATION: 0
WHEEZING: 0

## 2020-09-11 ASSESSMENT — COLUMBIA-SUICIDE SEVERITY RATING SCALE - C-SSRS
1. WITHIN THE PAST MONTH, HAVE YOU WISHED YOU WERE DEAD OR WISHED YOU COULD GO TO SLEEP AND NOT WAKE UP?: YES
3. HAVE YOU BEEN THINKING ABOUT HOW YOU MIGHT KILL YOURSELF?: YES
6. HAVE YOU EVER DONE ANYTHING, STARTED TO DO ANYTHING, OR PREPARED TO DO ANYTHING TO END YOUR LIFE?: YES
7. DID THIS OCCUR IN THE LAST THREE MONTHS: YES
4. HAVE YOU HAD THESE THOUGHTS AND HAD SOME INTENTION OF ACTING ON THEM?: YES
2. HAVE YOU ACTUALLY HAD ANY THOUGHTS OF KILLING YOURSELF?: YES

## 2020-09-11 NOTE — PROGRESS NOTES
[facility] [address]  [address] [address]  Dept: [phone]  Dept Fax: [phone]  Loc: [phone]    Reji Richards is a 48 y.o. female who presents today for her medical conditions/complaints as noted below. Reji Richards is c/o of   Chief Complaint   Patient presents with    Hand Pain     bilateral, right hand is worse    Depression     not doing well, stated just hanging on by a thread       HPI:     HPI Here today for depression and hand pain. Depression: worsening; she is very distraught. She is very upset because everyone chose her ex 's side. She feels like she has no one and she was the one being mistreated. Everyone's birthdays are this month and she is used to preparing for everyone's birthdays. She is not sleeping well. She feels like she is sleeping and then she realizes that she is just laying there. She is getting groggy from her sleeping pills. Her ex  has a new woman and he is posting everything on facebook. She has no support. She just can't shake it. She feels like she has been there for everyone and no one is there for her. She has thought about hurting herself. She feels worthless. She has thought about driving her car off the road. She is trying to read, mediate, but she can't stop crying. She is still working, but she is struggling. She gets up in the morning because she doesn't want to give anyone the satisfaction of knowing that she is upset. She is feeling weak. She does feel like she is in control right now and she is not actually going to hurt herself but she has to rosa the thoughts. She is supposed to see her grandchild this weekend and that is holding her up right now. Hand pain: worsening; she is having pain and numbness and tingling in her hand for the past 2 weeks.      Past Medical History:   Diagnosis Date    Anxiety     Depression     Hypercholesteremia with a low dose and titrate up as needed. she was instructed that it can take 4-6weeks before the medication is working to its full potential. I will see how she is doing in a month and increase the dose as needed. she was not interested in counseling at this time. I tried to convince her into going to the coping center since she is suicidal but she refused for now and will consider it after this weekend. Wrist and hand pain: new; likely a combination of arthritis and carpal tunnel syndrome. I did an xray to look for the severity of arthritis. Her arthritis was mild so I sent in prednisone to help and I can sent in mobic if needed once she finishes the prednisone. She was also provided with a wrist brace and some exercises. Xr Wrist Right (min 3 Views)    Result Date: 9/11/2020  EXAMINATION: 3 XRAY VIEWS OF THE RIGHT WRIST; THREE XRAY VIEWS OF THE RIGHT HAND 9/11/2020 4:27 pm COMPARISON: None. HISTORY: ORDERING SYSTEM PROVIDED HISTORY: Wrist pain, right TECHNOLOGIST PROVIDED HISTORY: wrist pain Reason for Exam: swelling and tightness feeling in right hand and wrist, no known injury Acuity: Chronic Type of Exam: Initial 60-year-old female with right wrist swelling, tightness; no known injury FINDINGS: Right wrist: Mild degenerative changes of the triscaphe and 1st CMC joint. Mild degenerative change of the 1st MCP joint. Osseous alignment is normal.  No marginal erosions are identified. No acute fracture or gross dislocation is seen. Scaphoid appears grossly intact. No significant soft tissue swelling is identified. Right hand: Osseous alignment is normal. Mild degenerative change of the triscaphe, 1st CMC and 1st MCP joints. Mild degenerative changes of the DIP joints. No marginal erosions are identified. No acute fracture or gross dislocation is seen. No focal soft tissue swelling is evident. Right wrist: 1. Mild osteoarthrosis. 2. No acute fracture or dislocation. Right hand: 1. Mild osteoarthrosis. 2. No acute fracture or dislocation. Xr Hand Right (min 3 Views)    Result Date: 9/11/2020  EXAMINATION: 3 XRAY VIEWS OF THE RIGHT WRIST; THREE XRAY VIEWS OF THE RIGHT HAND 9/11/2020 4:27 pm COMPARISON: None. HISTORY: ORDERING SYSTEM PROVIDED HISTORY: Wrist pain, right TECHNOLOGIST PROVIDED HISTORY: wrist pain Reason for Exam: swelling and tightness feeling in right hand and wrist, no known injury Acuity: Chronic Type of Exam: Initial 52-year-old female with right wrist swelling, tightness; no known injury FINDINGS: Right wrist: Mild degenerative changes of the triscaphe and 1st CMC joint. Mild degenerative change of the 1st MCP joint. Osseous alignment is normal.  No marginal erosions are identified. No acute fracture or gross dislocation is seen. Scaphoid appears grossly intact. No significant soft tissue swelling is identified. Right hand: Osseous alignment is normal. Mild degenerative change of the triscaphe, 1st CMC and 1st MCP joints. Mild degenerative changes of the DIP joints. No marginal erosions are identified. No acute fracture or gross dislocation is seen. No focal soft tissue swelling is evident. Right wrist: 1. Mild osteoarthrosis. 2. No acute fracture or dislocation. Right hand: 1. Mild osteoarthrosis. 2. No acute fracture or dislocation. Return in about 1 month (around 10/11/2020) for Depression follow up.     Orders Placed This Encounter   Procedures    XR HAND RIGHT (MIN 3 VIEWS)     Standing Status:   Future     Number of Occurrences:   1     Standing Expiration Date:   3/11/2021     Order Specific Question:   Reason for exam:     Answer:   hand pain    XR WRIST RIGHT (MIN 3 VIEWS)     Standing Status:   Future     Number of Occurrences:   1     Standing Expiration Date:   3/11/2021     Order Specific Question:   Reason for exam:     Answer:   wrist pain    Positive Screen for Clinical Depression with a Documented Follow-up Plan     Procare Comfort Form Wrist Brace Patient was prescribed a Procare Comfort Form Wrist brace. The right wrist will require stabilization / immobilization from this semi-rigid / rigid orthosis to improve their function. The orthosis will assist in protecting the affected area, provide functional support and facilitate healing. The patient was educated and fit by a healthcare professional with expert knowledge and specialization in brace application while under the direct supervision of the treating physician. Verbal and written instructions for the use of and application of this item were provided. They were instructed to contact the office immediately should the brace result in increased pain, decreased sensation, increased swelling or worsening of the condition. Orders Placed This Encounter   Medications    escitalopram (LEXAPRO) 10 MG tablet     Sig: Take 1 tablet by mouth daily     Dispense:  30 tablet     Refill:  3    predniSONE (DELTASONE) 20 MG tablet     Sig: Take 1 tablet by mouth 2 times daily for 5 days     Dispense:  10 tablet     Refill:  0       Patientgiven educational materials - see patient instructions. Discussed use, benefit,and side effects of prescribed medications. All patient questions answered. Ptvoiced understanding. Reviewed health maintenance. Instructed to continue currentmedications, diet and exercise. Patient agreed with treatment plan. Follow up asdirected.      Electronically signed by Scott Fung MD on 9/11/2020 at 6:14 PM

## 2020-09-11 NOTE — LETTER
[facility] [address] of [place] [address]  Phone: [phone]  Fax: [phone]    Willy Silvestre MD        September 11, 2020     Patient: Yoandy Fajardo   YOB: 1965   Date of Visit: 9/11/2020       To Whom it May Concern:    Yoandy Fajardo was seen in my clinic on 9/11/2020. She may return to work on 9/11/2020. If you have any questions or concerns, please don't hesitate to call. Sincerely,         Willy Silvestre MD/ C. Para Oppenheim LPN

## 2020-10-20 ENCOUNTER — HOSPITAL ENCOUNTER (OUTPATIENT)
Dept: LAB | Age: 55
Discharge: HOME | End: 2020-10-20
Payer: COMMERCIAL

## 2020-10-20 ENCOUNTER — OFFICE VISIT (OUTPATIENT)
Dept: FAMILY MEDICINE CLINIC | Age: 55
End: 2020-10-20
Payer: COMMERCIAL

## 2020-10-20 VITALS
HEIGHT: 70 IN | TEMPERATURE: 98.3 F | DIASTOLIC BLOOD PRESSURE: 78 MMHG | SYSTOLIC BLOOD PRESSURE: 118 MMHG | HEART RATE: 48 BPM | BODY MASS INDEX: 28.06 KG/M2 | OXYGEN SATURATION: 99 % | WEIGHT: 196 LBS

## 2020-10-20 PROBLEM — F32.4 MAJOR DEPRESSIVE DISORDER WITH SINGLE EPISODE, IN PARTIAL REMISSION (HCC): Status: ACTIVE | Noted: 2020-10-20

## 2020-10-20 LAB
ABSOLUTE EOS #: 0.14 K/UL (ref 0–0.4)
ABSOLUTE IMMATURE GRANULOCYTE: 0 K/UL (ref 0–0.3)
ABSOLUTE LYMPH #: 3.15 K/UL (ref 1–4.8)
ABSOLUTE MONO #: 0.42 K/UL (ref 0.1–1.2)
BASOPHILS # BLD: 1 % (ref 0–1)
BASOPHILS ABSOLUTE: 0.07 K/UL (ref 0–0.2)
C-REACTIVE PROTEIN: 0.9 MG/L (ref 0–5)
DIFFERENTIAL TYPE: ABNORMAL
EOSINOPHILS RELATIVE PERCENT: 2 % (ref 1–7)
HCT VFR BLD CALC: 37.3 % (ref 36.3–47.1)
HEMOGLOBIN: 12.2 G/DL (ref 11.9–15.1)
IMMATURE GRANULOCYTES: 0 %
LYMPHOCYTES # BLD: 45 % (ref 16–46)
MCH RBC QN AUTO: 34.8 PG (ref 25.2–33.5)
MCHC RBC AUTO-ENTMCNC: 32.7 G/DL (ref 25.2–33.5)
MCV RBC AUTO: 106.3 FL (ref 82.6–102.9)
MONOCYTES # BLD: 6 % (ref 4–11)
MORPHOLOGY: ABNORMAL
NRBC AUTOMATED: 0 PER 100 WBC
PDW BLD-RTO: 11.9 % (ref 11.8–14.4)
PLATELET # BLD: ABNORMAL K/UL (ref 138–453)
PLATELET ESTIMATE: ABNORMAL
PLATELET, FLUORESCENCE: 112 K/UL (ref 138–453)
PLATELET, IMMATURE FRACTION: 20.8 % (ref 1.1–10.3)
PMV BLD AUTO: ABNORMAL FL (ref 8.1–13.5)
RBC # BLD: 3.51 M/UL (ref 3.95–5.11)
RBC # BLD: ABNORMAL 10*6/UL
SEDIMENTATION RATE, ERYTHROCYTE: 16 MM (ref 0–30)
SEG NEUTROPHILS: 46 % (ref 43–77)
SEGMENTED NEUTROPHILS ABSOLUTE COUNT: 3.22 K/UL (ref 1.8–7.7)
URIC ACID: 3.5 MG/DL (ref 2.4–5.7)
WBC # BLD: 7 K/UL (ref 3.5–11.3)
WBC # BLD: ABNORMAL 10*3/UL

## 2020-10-20 PROCEDURE — 85025 COMPLETE CBC W/AUTO DIFF WBC: CPT

## 2020-10-20 PROCEDURE — G8427 DOCREV CUR MEDS BY ELIG CLIN: HCPCS | Performed by: FAMILY MEDICINE

## 2020-10-20 PROCEDURE — 86225 DNA ANTIBODY NATIVE: CPT

## 2020-10-20 PROCEDURE — 3017F COLORECTAL CA SCREEN DOC REV: CPT | Performed by: FAMILY MEDICINE

## 2020-10-20 PROCEDURE — 85055 RETICULATED PLATELET ASSAY: CPT

## 2020-10-20 PROCEDURE — 85651 RBC SED RATE NONAUTOMATED: CPT

## 2020-10-20 PROCEDURE — 84550 ASSAY OF BLOOD/URIC ACID: CPT

## 2020-10-20 PROCEDURE — 1036F TOBACCO NON-USER: CPT | Performed by: FAMILY MEDICINE

## 2020-10-20 PROCEDURE — 86235 NUCLEAR ANTIGEN ANTIBODY: CPT

## 2020-10-20 PROCEDURE — G8484 FLU IMMUNIZE NO ADMIN: HCPCS | Performed by: FAMILY MEDICINE

## 2020-10-20 PROCEDURE — 36415 COLL VENOUS BLD VENIPUNCTURE: CPT

## 2020-10-20 PROCEDURE — G8419 CALC BMI OUT NRM PARAM NOF/U: HCPCS | Performed by: FAMILY MEDICINE

## 2020-10-20 PROCEDURE — 86140 C-REACTIVE PROTEIN: CPT

## 2020-10-20 PROCEDURE — 99214 OFFICE O/P EST MOD 30 MIN: CPT | Performed by: FAMILY MEDICINE

## 2020-10-20 RX ORDER — NABUMETONE 750 MG/1
750 TABLET, FILM COATED ORAL 2 TIMES DAILY PRN
Qty: 60 TABLET | Refills: 3 | Status: SHIPPED | OUTPATIENT
Start: 2020-10-20

## 2020-10-20 ASSESSMENT — ENCOUNTER SYMPTOMS
COUGH: 0
WHEEZING: 0
CHEST TIGHTNESS: 0
SHORTNESS OF BREATH: 0

## 2020-10-20 NOTE — PROGRESS NOTES
diclofenac sodium (VOLTAREN) 1 % GEL Apply 2 g topically 4 times daily as needed for Pain 1 Tube 3    doxepin (SINEQUAN) 10 MG capsule Take 1 capsule by mouth nightly as needed (sleep) 30 capsule 1    Omega-3 Fatty Acids (FISH OIL) 1000 MG CAPS Take 3 capsules by mouth 3 times daily 90 capsule 3    Multiple Vitamins-Minerals (CENTRUM WOMEN PO) Take 1 tablet by mouth daily       No current facility-administered medications for this visit. No Known Allergies    Subjective:     Review of Systems   Constitutional: Negative for activity change, appetite change, chills, fatigue and fever. Eyes: Negative for visual disturbance. Respiratory: Negative for cough, chest tightness, shortness of breath and wheezing. Cardiovascular: Negative for chest pain, palpitations and leg swelling. Genitourinary: Negative for difficulty urinating. Musculoskeletal: Positive for arthralgias (right wrist) and joint swelling. Neurological: Negative for dizziness, syncope, weakness and light-headedness. Psychiatric/Behavioral: Positive for dysphoric mood (improving). Negative for decreased concentration and suicidal ideas. The patient is not nervous/anxious. Objective:      Physical Exam  Vitals signs and nursing note reviewed. Constitutional:       General: She is not in acute distress. Appearance: She is well-developed. Eyes:      Conjunctiva/sclera: Conjunctivae normal.   Neck:      Musculoskeletal: Normal range of motion and neck supple. Thyroid: No thyromegaly. Cardiovascular:      Rate and Rhythm: Normal rate and regular rhythm. Heart sounds: Normal heart sounds. No murmur. Pulmonary:      Effort: Pulmonary effort is normal. No respiratory distress. Breath sounds: Normal breath sounds. No wheezing. Musculoskeletal:      Right wrist: She exhibits decreased range of motion, tenderness, bony tenderness and swelling. She exhibits no effusion, no crepitus and no deformity.       Right hand: She exhibits decreased range of motion (of the 3rd and 4th fingers), tenderness and deformity (small lump over dorsum of hand). She exhibits no swelling. Decreased sensation noted. Decreased sensation is present in the ulnar distribution. Decreased strength noted. Hands:    Lymphadenopathy:      Cervical: No cervical adenopathy. Skin:     General: Skin is warm and dry. Findings: No erythema or rash. Neurological:      Mental Status: She is alert and oriented to person, place, and time. /78   Pulse (!) 48   Temp 98.3 °F (36.8 °C)   Ht 5' 9.75\" (1.772 m)   Wt 196 lb (88.9 kg)   SpO2 99%   BMI 28.33 kg/m²     Assessment:       Diagnosis Orders   1. Wrist pain, right  CRISTIAN profile    CBC Auto Differential    C-Reactive Protein    Sedimentation Rate    Uric Acid   2. Major depressive disorder with single episode, [facility]               Plan:        Wrist pain: worsening; she now has an area of inflammation that looks like a nodule so I will check for autoimmune disease and I will check a uric acid level. If these are normal I will refer her to ortho. I also put her on nabumetone to help with her pain and swelling. Depression: improving; she is doing very well and she feels like she was able to pull herself out of her dark place. She plans to continue praying and staying in touch with family. Return in about 3 months (around 1/20/2021) for Depression follow up.     Orders Placed This Encounter   Procedures    CRISTIAN profile     Standing Status:   Future     Number of Occurrences:   1     Standing Expiration Date:   10/20/2021    CBC Auto Differential     Standing Status:   Future     Number of Occurrences:   1     Standing Expiration Date:   10/20/2021    C-Reactive Protein     Standing Status:   Future     Number of Occurrences:   1     Standing Expiration Date:   10/20/2021    Sedimentation Rate     Standing Status:   Future     Number of Occurrences:   1 Standing Expiration Date:   10/20/2021    Uric Acid     Standing Status:   Future     Number of Occurrences:   1     Standing Expiration Date:   10/20/2021     Orders Placed This Encounter   Medications    nabumetone (RELAFEN) 750 MG tablet     Sig: Take 1 tablet by mouth 2 times daily as needed for Pain     Dispense:  60 tablet     Refill:  3       Patientgiven educational materials - see patient instructions. Discussed use, benefit,and side effects of prescribed medications. All patient questions answered. Ptvoiced understanding. Reviewed health maintenance. Instructed to continue currentmedications, diet and exercise. Patient agreed with treatment plan. Follow up asdirected.      Electronically signed by Leighann Garnett MD on 10/20/2020 at 5:19 PM

## 2020-10-21 LAB
ANA REFERENCE RANGE:: NORMAL
ANTI DNA DOUBLE STRANDED: 0 IU/ML
ANTI JO-1 IGG: 8 U/ML
ANTI RNP AB: 20 U/ML
ANTI SSA: 9 U/ML
ANTI SSB: 7 U/ML
ANTI-CENTROMERE: 4 U/ML
ANTI-SCLERODERMA: 12 U/ML
ANTI-SMITH: 10 U/ML
HISTONE ANTIBODY: 5 U/ML

## 2020-10-28 ENCOUNTER — HOSPITAL ENCOUNTER (OUTPATIENT)
Dept: MAMMOGRAPHY | Age: 55
Discharge: HOME | End: 2020-10-30
Payer: COMMERCIAL

## 2020-10-28 PROCEDURE — 77063 BREAST TOMOSYNTHESIS BI: CPT

## 2020-11-03 ENCOUNTER — OFFICE VISIT (OUTPATIENT)
Dept: ORTHOPEDIC SURGERY | Age: 55
End: 2020-11-03
Payer: COMMERCIAL

## 2020-11-03 VITALS
BODY MASS INDEX: 29.03 KG/M2 | SYSTOLIC BLOOD PRESSURE: 118 MMHG | WEIGHT: 196 LBS | HEART RATE: 58 BPM | HEIGHT: 69 IN | DIASTOLIC BLOOD PRESSURE: 76 MMHG

## 2020-11-03 PROCEDURE — G8484 FLU IMMUNIZE NO ADMIN: HCPCS | Performed by: NURSE PRACTITIONER

## 2020-11-03 PROCEDURE — 1036F TOBACCO NON-USER: CPT | Performed by: NURSE PRACTITIONER

## 2020-11-03 PROCEDURE — L3908 WHO COCK-UP NONMOLDE PRE OTS: HCPCS | Performed by: NURSE PRACTITIONER

## 2020-11-03 PROCEDURE — 99203 OFFICE O/P NEW LOW 30 MIN: CPT | Performed by: NURSE PRACTITIONER

## 2020-11-03 PROCEDURE — 3017F COLORECTAL CA SCREEN DOC REV: CPT | Performed by: NURSE PRACTITIONER

## 2020-11-03 PROCEDURE — G8419 CALC BMI OUT NRM PARAM NOF/U: HCPCS | Performed by: NURSE PRACTITIONER

## 2020-11-03 PROCEDURE — G8427 DOCREV CUR MEDS BY ELIG CLIN: HCPCS | Performed by: NURSE PRACTITIONER

## 2020-11-03 RX ORDER — NAPROXEN 375 MG/1
375 TABLET ORAL 2 TIMES DAILY WITH MEALS
Qty: 42 TABLET | Refills: 1 | Status: SHIPPED | OUTPATIENT
Start: 2020-11-03 | End: 2020-11-24

## 2020-11-03 NOTE — PROGRESS NOTES
Orthopaedic Progress Note      CHIEF COMPLAINT: Right wrist arm pain    HISTORY OF PRESENT ILLNESS:       Ms. Rometta Kocher  is a 48 y.o. female  who presents with right wrist and arm pain. Patient states the pain has been ongoing for about 5-1/2 months now. Patient states she started a new job approximately 7 months ago. She began having issues with her right arm approximately 2 months into the new job. She states she was pushing a freezer and had pain to her right wrist at that time. She states the pain has progressively worsened over the past few months. She states she is getting swelling within the right wrist.  She states that she does have numbness and tingling within the right middle and ring finger. Pain will wake her up at nighttime she will have pain with activity of the right wrist.  She states she will have pain that will shoot up from the right wrist to her shoulder. She is losing loss of sensation to her fingertips of the right hand. She also has been dropping items. She has tried over-the-counter pain medicine without relief. She does do a lot of repetitive motions of her bilateral wrist at work. Past Medical History:    Past Medical History:   Diagnosis Date    Anxiety     Depression     Hypercholesteremia     Insomnia     Type 2 diabetes mellitus ([facility]        Past Surgical History:    History reviewed. No pertinent surgical history.       Current  Medications:  Current Outpatient Medications   Medication Sig Dispense Refill    naproxen (NAPROSYN) 375 MG tablet Take 1 tablet by mouth 2 times daily (with meals) for 21 days 42 tablet 1    diclofenac sodium (VOLTAREN) 1 % GEL Apply 4 g topically 4 times daily 1 Tube 0    nabumetone (RELAFEN) 750 MG tablet Take 1 tablet by mouth 2 times daily as needed for Pain 60 tablet 3    diclofenac sodium (VOLTAREN) 1 % GEL Apply 2 g topically 4 times daily as needed for Pain 1 Tube 3    doxepin (SINEQUAN) 10 MG capsule Take 1 capsule by 105 06/11/2020    CO2 26 06/11/2020    BUN 17 06/11/2020    CREATININE 0.72 06/11/2020    CALCIUM 9.4 06/11/2020    GLUCOSE 133 06/11/2020     PT/INR:  No results found for: PROTIME, INR  Troponin:  No results found for: TROPONINI  No results for input(s): LIPASE, AMYLASE in the last 72 hours. No results for input(s): AST, ALT, BILIDIR, BILITOT, ALKPHOS in the last 72 hours. Uric Acid:  No components found for: URIC  Urine Culture:  No components found for: CURINE    Radiology:   [facility] Nas Digital Screen Bilateral    Result Date: 10/28/2020  EXAMINATION: SCREENING DIGITAL BILATERAL  MAMMOGRAM WITH TOMOSYNTHESIS 10/28/2020 TECHNIQUE: Screening mammography was performed with tomosynthesis including MLO and CC views of the bilateral breasts. Computer aided detection was used for the interpretation of this exam. COMPARISON: None available for direct comparison. HISTORY: Screening. FINDINGS: There are scattered areas of fibroglandular density. There is no new dominant mass, suspicious microcalcification, or area of architectural distortion. No mammographic evidence of malignancy. BI-RADS 1 BIRADS: BIRADS - CATEGORY 1 Negative, no evidence of malignancy. Normal interval follow-up is recommended in 12 months. OVERALL ASSESSMENT - NEGATIVE A letter of notification will be sent to the patient regarding the results. The [employer] of Radiology recommends annual mammograms for women 40 years and older. X-rays personally reviewed by me of x-rays three views of the right wrist and hand shows no acute fractures or abnormalities. There is some osteoarthritis noted. Overall normal-appearing x-ray. Diagnosis Orders   1. Pain and numbness of right upper extremity  EMG    Who cock-up nonmolde pre ots         PLAN:  Plan at this time we will get patient scheduled for a right upper extremity EMG. We will also get her a cock-up wrist splint to help with her pain.   We will send a prescription of naproxen and Voltaren gel into her pharmacy. We will follow-up once we have her EMG results reviewed. Procedures    Who cock-up nonmolde pre ots     Patient was prescribed a Procare Comfort Form Wrist brace. The right wrist will require stabilization / immobilization from this semi-rigid / rigid orthosis to improve their function. The orthosis will assist in protecting the affected area, provide functional support and facilitate healing. The patient was educated and fit by a healthcare professional with expert knowledge and specialization in brace application while under the direct supervision of the treating physician. Verbal and written instructions for the use of and application of this item were provided. They were instructed to contact the office immediately should the brace result in increased pain, decreased sensation, increased swelling or worsening of the condition.         Procedure:        Electronically signed by CELESTE Villasenor CNP on 11/3/2020 at 12:09 PM

## 2020-11-17 ENCOUNTER — HOSPITAL ENCOUNTER (OUTPATIENT)
Dept: NEUROLOGY | Age: 55
Discharge: HOME | End: 2020-11-17
Payer: COMMERCIAL

## 2020-11-17 PROCEDURE — 95910 NRV CNDJ TEST 7-8 STUDIES: CPT

## 2020-11-17 PROCEDURE — 95886 MUSC TEST DONE W/N TEST COMP: CPT

## 2020-11-17 NOTE — PROGRESS NOTES
EMG/NCS Right    upper Completed    PCP: Rowan Alfredo MD    Ordering: HUSSEIN [place], [place]    Interpreting Physician: Deepthi Elmore.  Zenia Lanier, Neurologist    Technician: Emeli Wolf RN

## 2020-11-17 NOTE — PROGRESS NOTES
EMG/NCS Right    upper Completed    PCP: Lulla Lesch, MD    Ordering: Ar Gillette. [place] Neurologist    Interpreting Physician: Ar Gillette.  Hayden Su, Neurologist    Technician: Almita Murray RN

## 2020-11-18 NOTE — PROCEDURES
Anna [address]                 [address] [place] [id number]                        EMG/NERVE CONDUCTION STUDIES REPORT        PATIENT NAME: Marci Krueger                       :        1965  MED REC NO:   [id number]                             ROOM:  ACCOUNT NO:   [de-identified]                           ADMIT DATE: 2020  PROVIDER:     Rebecca Smiley MD, F.A. A. N    DATE OF EM2020    REFERRING PROVIDER:  José Larson CNP    PRIMARY CARE PROVIDER:  David Rivera MD      TECHNICAL SUMMARY:  The nature, purpose, goals, expectations and process  involved in the procedures of nerve conduction studies and needle  electromyography were reviewed, discussed, explained and verbal consent  was obtained from the patient. The patient's questions were answered  with reference to the above processes and procedures. There were no significant technical difficulties encountered during this  study and nerve conduction studies were performed under temperature  monitoring. CLINICAL DATA:        The patient is 48years old with a history of pain  involving the right hand, forearm. The patient indicates her pain also  radiate down from the right shoulder. The patient also has shoulder  pain. The patient also has stiffness involving the right arm. The  patient has numbness in the fingers. The patient has known history of  type 2 diabetes. The patient also has occasional neck pain. The purpose of the study is to evaluate for mononeuropathy,  radiculopathy, plexopathy. SUMMARY:        The sensory nerve action potentials of the right radial nerve  are within normal limits. Sensory nerve action potentials of the right median nerve not  recordable. Sensory nerve action potentials of the right ulnar nerve shows normal  amplitude, distal latencies.     Mixed nerve palmar potentials of the right median nerve shows low  amplitude and significantly prolonged distal latencies. Ulnar palmar  potentials are within normal limits. Compound muscle action potentials of the right median nerve shows normal  amplitude at wrist, low amplitude at elbow with prolonged distal  latencies and normal conduction velocity. Compound muscle action potentials of the right ulnar nerve shows normal  amplitude, distal latency, conduction velocity. Proximal conductions as measured by the F responses were prolonged in  the right median nerve and normal in the right ulnar nerve. Needle electromyography shows no significant abnormalities. Nerve  Conductions   Results  Were  Personally  Reviewed and  Analysed. Abnormal  Nerve  Conductions  Were  Personally  Repeated,  Verified, reconfirmed  And  Updated as needed  appropriately. Please    See   Wave  Forms   And    Details  Of     Nerve  Conduction   Studies   For  Additional  Information               Needle  Electromyography  Was personally  Performed  In   right     Upper   Extremirty   In  The  Following  Muscles :    Deltoid,  Biceps  Brachii,  Triceps . Pronator  Teres,  Flexor Carpi Ulnaris,    Ext. Digitorum Communis           Needle electromyography of the small muscles of the hand is not  performed as the patient has hypersensitivity. Extensive  Needle  Electromyography  Shows  No   Abnormality with :      A) Normal  insertional  Activity. There  Is  Absence  Of   P  Waves,  Fibrillations,  Fasciculations and        Other  Spontaneous   Activity. B) Voluntary  Motor unit  Potentials    Show :    Normal  Effort,  Recruitment, amplitude,  Duration. No Polyphasia  Noted. IMPRESSION:        1. There is electrodiagnostic evidence of moderate median        mononeuropathy at the right wrist (carpal tunnel syndrome). 2.  There is electrodiagnostic evidence of mild right median         mononeuropathy at right elbow.         3.  There is no definite electrodiagnostic evidence of any other        mononeuropathy or radiculopathy involving examined right upper        Extremity. Further clinical correlation and followup recommended. Lyn Garcia MD, [address] [place] [address]     Board Certified in Neurology  & in  [address] [address] [facility] ([facility]).           D: 2020 15:58:57       T: 2020 16:34:24     [id number]  Job#: [id number]     Doc#: [id number]    CC:  [facility]

## 2020-11-24 ENCOUNTER — TELEPHONE (OUTPATIENT)
Dept: ORTHOPEDIC SURGERY | Age: 55
End: 2020-11-24

## 2020-11-24 NOTE — TELEPHONE ENCOUNTER
Dr. Kristina Osborn reviewed her emg of right upper extremity, and stated he recommended a right carpal tunnel release. Patient in agreement. Nurse visit appointment made to schedule surgery and go over paperwork.

## 2020-12-02 ENCOUNTER — NURSE ONLY (OUTPATIENT)
Dept: ORTHOPEDIC SURGERY | Age: 55
End: 2020-12-02
Payer: COMMERCIAL

## 2021-02-10 LAB
AVERAGE GLUCOSE: NORMAL
HBA1C MFR BLD: 6 %

## 2021-08-02 ENCOUNTER — HOSPITAL ENCOUNTER (OUTPATIENT)
Dept: LAB | Age: 56
Discharge: HOME | End: 2021-08-02

## 2021-08-02 LAB — RUBV IGG SER QL: 352.5 IU/ML

## 2021-08-02 PROCEDURE — 86787 VARICELLA-ZOSTER ANTIBODY: CPT

## 2021-08-02 PROCEDURE — 86765 RUBEOLA ANTIBODY: CPT

## 2021-08-02 PROCEDURE — 86762 RUBELLA ANTIBODY: CPT

## 2021-08-02 PROCEDURE — 86735 MUMPS ANTIBODY: CPT

## 2021-08-02 PROCEDURE — 36415 COLL VENOUS BLD VENIPUNCTURE: CPT

## 2021-08-04 LAB
MEASLES ANTIBODY IGG: 1.77
MUV IGG SER QL: 4.18
VZV IGG SER QL IA: 2.48